# Patient Record
(demographics unavailable — no encounter records)

---

## 2024-10-10 NOTE — REVIEW OF SYSTEMS
[Obesity] : obesity [Shortness Of Breath] : no shortness of breath [Difficulty Initiating Sleep] : no difficulty falling asleep

## 2024-10-10 NOTE — ASSESSMENT
[FreeTextEntry1] : 45YO Male active smoker with NICM (EF 20-25% and grade II diastolic dysfunction), diabetes, and severe KELLY (WatchPAT pAHI 40.8/hr pAHIc 2.1.hr) who presents for follow up.  #Severe KELLY - 6/9/24 WatchPAT pAHI 40.8/hr, pAHIc 2.1.hr, T90 2.5% - 9/20/2024: PSG/PAP titration showing moderate KELLY AHI 29.8/h (3 Central events) split criteria was met however patient did not tolerate PAP therapy both ResMed full face mask F20 medium mask and F&P Eson 2 nasal mask were applied however patient requested not to proceed with PAP titration -Will order in lab CPAP titration and will start off with nasal masks as he tolerated Eson 2, or nasal pillows interface; he was told that he would be allowed to be on his side rather than having to stay on his back which is not his preferred sleeping position.  -Advised that he would need to increase his sleep time and dangers of insufficient sleep and untreated sleep apnea were told the patient  RTC after CPAP titration

## 2024-10-10 NOTE — HISTORY OF PRESENT ILLNESS
[FreeTextEntry1] : 47YO Male active smoker with NICM (EF 20-25% and grade II diastolic dysfunction), diabetes, and severe KELLY (WatchPAT pAHI 40.8/hr pAHIc 2.1.hr) who presents for follow up.  He states that he was told to lay on his back. He reports that he could not tolerate the Full face mask however he tolerated the Eson 2 mask somewhat better.  He is working multiple jobs and reports he is only sleeping 2-3hrs a night because of that.    Diagnostic Studies 6/9/24 WatchPAT pAHI 40.8/hr, pAHIc 2.1.hr, T90 2.5% 9/20/2024: PSG/PAP titration showing moderate KELLY AHI 29.8/h (3 Central events) split criteria was met however patient did not tolerate PAP therapy both ResMed full face mask F20 medium mask and F&P Eson 2 nasal mask were applied however patient requested not to proceed with PAP titration

## 2024-10-10 NOTE — PHYSICAL EXAM
[Heart Rate And Rhythm] : heart rate was normal and rhythm regular [Heart Sounds] : normal S1 and S2 [] : no respiratory distress [Respiration, Rhythm And Depth] : normal respiratory rhythm and effort [Auscultation Breath Sounds / Voice Sounds] : lungs were clear to auscultation bilaterally [Abnormal Walk] : normal gait [Oriented To Time, Place, And Person] : oriented to person, place, and time [FreeTextEntry1] : No lower extremity edema